# Patient Record
Sex: FEMALE | Race: WHITE
[De-identification: names, ages, dates, MRNs, and addresses within clinical notes are randomized per-mention and may not be internally consistent; named-entity substitution may affect disease eponyms.]

---

## 2017-06-10 ENCOUNTER — HEALTH MAINTENANCE LETTER (OUTPATIENT)
Age: 22
End: 2017-06-10

## 2017-08-01 ENCOUNTER — TRANSFERRED RECORDS (OUTPATIENT)
Dept: HEALTH INFORMATION MANAGEMENT | Facility: CLINIC | Age: 22
End: 2017-08-01

## 2017-08-01 LAB — PAP SMEAR - HIM PATIENT REPORTED: NEGATIVE

## 2017-08-26 ENCOUNTER — HEALTH MAINTENANCE LETTER (OUTPATIENT)
Age: 22
End: 2017-08-26

## 2019-07-01 ENCOUNTER — OFFICE VISIT (OUTPATIENT)
Dept: FAMILY MEDICINE | Facility: CLINIC | Age: 24
End: 2019-07-01
Payer: COMMERCIAL

## 2019-07-01 VITALS
RESPIRATION RATE: 16 BRPM | HEIGHT: 66 IN | BODY MASS INDEX: 24.59 KG/M2 | HEART RATE: 69 BPM | WEIGHT: 153 LBS | SYSTOLIC BLOOD PRESSURE: 120 MMHG | TEMPERATURE: 98.3 F | DIASTOLIC BLOOD PRESSURE: 68 MMHG

## 2019-07-01 DIAGNOSIS — R30.0 DYSURIA: Primary | ICD-10-CM

## 2019-07-01 DIAGNOSIS — E87.6 HYPOKALEMIA: ICD-10-CM

## 2019-07-01 DIAGNOSIS — Z11.8 SPECIAL SCREENING EXAMINATION FOR CHLAMYDIAL DISEASE: ICD-10-CM

## 2019-07-01 LAB
ALBUMIN UR-MCNC: NEGATIVE MG/DL
APPEARANCE UR: CLEAR
BILIRUB UR QL STRIP: NEGATIVE
COLOR UR AUTO: YELLOW
GLUCOSE UR STRIP-MCNC: NEGATIVE MG/DL
HGB UR QL STRIP: ABNORMAL
KETONES UR STRIP-MCNC: NEGATIVE MG/DL
LEUKOCYTE ESTERASE UR QL STRIP: ABNORMAL
NITRATE UR QL: NEGATIVE
PH UR STRIP: 5.5 PH (ref 5–7)
RBC #/AREA URNS AUTO: ABNORMAL /HPF
SOURCE: ABNORMAL
SP GR UR STRIP: 1.01 (ref 1–1.03)
UROBILINOGEN UR STRIP-ACNC: 0.2 EU/DL (ref 0.2–1)
WBC #/AREA URNS AUTO: ABNORMAL /HPF

## 2019-07-01 PROCEDURE — 87086 URINE CULTURE/COLONY COUNT: CPT | Performed by: PHYSICIAN ASSISTANT

## 2019-07-01 PROCEDURE — 81001 URINALYSIS AUTO W/SCOPE: CPT | Performed by: PHYSICIAN ASSISTANT

## 2019-07-01 PROCEDURE — 99203 OFFICE O/P NEW LOW 30 MIN: CPT | Performed by: PHYSICIAN ASSISTANT

## 2019-07-01 PROCEDURE — 36415 COLL VENOUS BLD VENIPUNCTURE: CPT | Performed by: PHYSICIAN ASSISTANT

## 2019-07-01 PROCEDURE — 87491 CHLMYD TRACH DNA AMP PROBE: CPT | Performed by: PHYSICIAN ASSISTANT

## 2019-07-01 PROCEDURE — 84132 ASSAY OF SERUM POTASSIUM: CPT | Performed by: PHYSICIAN ASSISTANT

## 2019-07-01 RX ORDER — LEVONORGESTREL AND ETHINYL ESTRADIOL 0.15-0.03
1 KIT ORAL DAILY
COMMUNITY
Start: 2018-11-29

## 2019-07-01 ASSESSMENT — ENCOUNTER SYMPTOMS
RESPIRATORY NEGATIVE: 1
CARDIOVASCULAR NEGATIVE: 1
GASTROINTESTINAL NEGATIVE: 1
PSYCHIATRIC NEGATIVE: 1
EYES NEGATIVE: 1
CONSTITUTIONAL NEGATIVE: 1
MUSCULOSKELETAL NEGATIVE: 1
NEUROLOGICAL NEGATIVE: 1

## 2019-07-01 ASSESSMENT — MIFFLIN-ST. JEOR: SCORE: 1465.75

## 2019-07-01 NOTE — PROGRESS NOTES
Subjective     Haley Ann Schoenecker is a 24 year old female who presents to clinic today for the following health issues:    HPI   URINARY TRACT SYMPTOMS      Duration: 2-3 days    Description  dysuria and frequency in the morning    Intensity:  moderate    Accompanying signs and symptoms:  Fever/chills: no   Flank pain no   Nausea and vomiting: no   Vaginal symptoms: none  Abdominal/Pelvic Pain: no     History  History of frequent UTI's: no   History of kidney stones: no   Sexually Active: YES  Possibility of pregnancy: No    Precipitating or alleviating factors: None    Therapies tried and outcome: increase fluid intake   Outcome: n/a    No history of UTI  Same sexual partner for 3 years  No vaginal discharge, itching, rash    Hypokalemia (potassium of 3.1 on routine BMP 6 months ago).  Patient is requesting this be rechecked today.     There is no problem list on file for this patient.    History reviewed. No pertinent surgical history.    Social History     Tobacco Use     Smoking status: Never Smoker     Smokeless tobacco: Never Used   Substance Use Topics     Alcohol use: Yes     Alcohol/week: 0.0 oz     Family History   Problem Relation Age of Onset     Hypertension Mother      Melanoma Mother          Current Outpatient Medications   Medication Sig Dispense Refill     levonorgestrel-ethinyl estradiol (JOLESSA) 0.15-0.03 MG tablet Take 1 tablet by mouth daily       clindamycin (CLEOCIN T) 1 % external solution Apply topically daily       drospirenone-ethinyl estradiol (OSKAR) 3-0.03 MG per tablet Take 1 tablet by mouth daily       tretinoin (RETIN-A) 0.1 % cream Apply topically At Bedtime       No Known Allergies    Reviewed and updated as needed this visit by Provider         Review of Systems   Constitutional: Negative.    HENT: Negative.    Eyes: Negative.    Respiratory: Negative.    Cardiovascular: Negative.    Gastrointestinal: Negative.    Musculoskeletal: Negative.    Skin: Negative.   "  Neurological: Negative.    Psychiatric/Behavioral: Negative.          Objective    /68 (Cuff Size: Adult Regular)   Pulse 69   Temp 98.3  F (36.8  C) (Tympanic)   Resp 16   Ht 1.676 m (5' 6\")   Wt 69.4 kg (153 lb)   BMI 24.69 kg/m    Physical Exam   Constitutional: She is oriented to person, place, and time. She appears well-developed and well-nourished. No distress.   HENT:   Head: Normocephalic and atraumatic.   Nose: Nose normal.   Eyes: Conjunctivae and EOM are normal.   Neck: Normal range of motion.   Pulmonary/Chest: Effort normal.   Abdominal: Soft. There is no tenderness. There is no rigidity, no guarding and no CVA tenderness.   Neurological: She is alert and oriented to person, place, and time.   Skin: Skin is warm and dry.   Psychiatric: She has a normal mood and affect. Her behavior is normal.       Diagnostic Test Results:  Results for orders placed or performed in visit on 07/01/19 (from the past 24 hour(s))   UA with Microscopic reflex to Culture   Result Value Ref Range    Color Urine Yellow     Appearance Urine Clear     Glucose Urine Negative NEG^Negative mg/dL    Bilirubin Urine Negative NEG^Negative    Ketones Urine Negative NEG^Negative mg/dL    Specific Gravity Urine 1.010 1.003 - 1.035    pH Urine 5.5 5.0 - 7.0 pH    Protein Albumin Urine Negative NEG^Negative mg/dL    Urobilinogen Urine 0.2 0.2 - 1.0 EU/dL    Nitrite Urine Negative NEG^Negative    Blood Urine Trace (A) NEG^Negative    Leukocyte Esterase Urine Trace (A) NEG^Negative    Source Midstream Urine     WBC Urine 0 - 5 OTO5^0 - 5 /HPF    RBC Urine O - 2 OTO2^O - 2 /HPF           Assessment & Plan   Problem List Items Addressed This Visit     None      Visit Diagnoses     Dysuria    -  Primary    Relevant Orders    UA with Microscopic reflex to Culture (Completed)    Urine Culture Aerobic Bacterial    Special screening examination for chlamydial disease        Relevant Orders    Chlamydia trachomatis PCR    Hypokalemia    "     Relevant Orders    Potassium         Dysuria - plan is to culture, IF POSITIVE, we will treat  If negative and symptoms resist - check UA first void of day (lab only appointment)    There are no Patient Instructions on file for this visit.    Return in about 1 week (around 7/8/2019) for a recheck if you are not improved.    Adalgisa Carlin PA-C  [facility]

## 2019-07-01 NOTE — Clinical Note
Please abstract the following data from this visit with this patient into the appropriate field in Epic:Pap smear done on this date: 8/2017 (approximately), by this group: Stephanie bruner, results were normal.

## 2019-07-02 LAB
BACTERIA SPEC CULT: NORMAL
C TRACH DNA SPEC QL NAA+PROBE: NEGATIVE
POTASSIUM SERPL-SCNC: 3.7 MMOL/L (ref 3.4–5.3)
SPECIMEN SOURCE: NORMAL
SPECIMEN SOURCE: NORMAL

## 2019-07-03 ENCOUNTER — TELEPHONE (OUTPATIENT)
Dept: FAMILY MEDICINE | Facility: CLINIC | Age: 24
End: 2019-07-03

## 2019-07-03 DIAGNOSIS — R82.90 NONSPECIFIC FINDING ON EXAMINATION OF URINE: Primary | ICD-10-CM

## 2019-07-03 DIAGNOSIS — R31.9 HEMATURIA, UNSPECIFIED TYPE: Primary | ICD-10-CM

## 2019-07-03 DIAGNOSIS — R31.9 HEMATURIA, UNSPECIFIED TYPE: ICD-10-CM

## 2019-07-03 LAB
ALBUMIN UR-MCNC: 30 MG/DL
APPEARANCE UR: CLEAR
BILIRUB UR QL STRIP: NEGATIVE
COLOR UR AUTO: YELLOW
GLUCOSE UR STRIP-MCNC: NEGATIVE MG/DL
HGB UR QL STRIP: ABNORMAL
KETONES UR STRIP-MCNC: NEGATIVE MG/DL
LEUKOCYTE ESTERASE UR QL STRIP: ABNORMAL
NITRATE UR QL: NEGATIVE
NON-SQ EPI CELLS #/AREA URNS LPF: ABNORMAL /LPF
PH UR STRIP: 8.5 PH (ref 5–7)
RBC #/AREA URNS AUTO: ABNORMAL /HPF
SOURCE: ABNORMAL
SP GR UR STRIP: 1.02 (ref 1–1.03)
UROBILINOGEN UR STRIP-ACNC: 0.2 EU/DL (ref 0.2–1)
WBC #/AREA URNS AUTO: ABNORMAL /HPF

## 2019-07-03 PROCEDURE — 81001 URINALYSIS AUTO W/SCOPE: CPT | Performed by: PHYSICIAN ASSISTANT

## 2019-07-03 PROCEDURE — 87086 URINE CULTURE/COLONY COUNT: CPT | Performed by: PHYSICIAN ASSISTANT

## 2019-07-03 RX ORDER — SULFAMETHOXAZOLE/TRIMETHOPRIM 800-160 MG
1 TABLET ORAL 2 TIMES DAILY
Qty: 10 TABLET | Refills: 0 | Status: SHIPPED | OUTPATIENT
Start: 2019-07-03 | End: 2019-07-08

## 2019-07-03 NOTE — TELEPHONE ENCOUNTER
Reason for call:  Patient reporting a symptom    Symptom or request:   Patient was seen Monday for UTI    Lab results were inconclusive  Natanael Carlin said she could follow up at the lab but there are no orders  Her symptoms are worsening and she has blood in her urine  Patient would like to come today for UA    Duration (how long have symptoms been present): Since at least Monday 07/01/2019    Have you been treated for this before? Yes    Additional comments:    She would like to come today for UA    Phone Number patient can be reached at:  Cell number on file:    Telephone Information:   Mobile [phone]       Best Time:  anytime    Can we leave a detailed message on this number:  YES    Call taken on 7/3/2019 at 3:06 PM by TEO BROWNLEE

## 2019-07-03 NOTE — RESULT ENCOUNTER NOTE
Manisha    Your lab tests are complete and I have reviewed the results.     - Your lab results look great; everything is normal.    If you have any questions or concerns, please feel free to call or send a [employer] message.    Sincerely,  Natanael Carlin PA-C

## 2019-07-03 NOTE — TELEPHONE ENCOUNTER
Patient should leave a UA at any [facility] TODAY.     I also sent an antibiotic for UTI to the pharmacy in her chart ([facility] in [facility]).  She should  the antibiotic and start it right away.     Natanael Carlin PA-C

## 2019-07-05 ENCOUNTER — TELEPHONE (OUTPATIENT)
Dept: FAMILY MEDICINE | Facility: CLINIC | Age: 24
End: 2019-07-05

## 2019-07-05 DIAGNOSIS — N30.01 ACUTE CYSTITIS WITH HEMATURIA: Primary | ICD-10-CM

## 2019-07-05 LAB
BACTERIA SPEC CULT: NO GROWTH
SPECIMEN SOURCE: NORMAL

## 2019-07-05 NOTE — TELEPHONE ENCOUNTER
Called and left message for patient - just calling to check in and see if the antibiotic is working well.      We are still waiting for the urine culture results.     Natanael Carlin PA-C

## 2019-07-05 NOTE — RESULT ENCOUNTER NOTE
Manisha    Your lab tests are complete and I have reviewed the results.     - The UA was consistent with UTI  - The urine culture did not isolate any specific bacteria.    Take the antibiotic as prescribed, follow up if symptoms do not resolve.     If you have any questions or concerns, please feel free to call or send a [employer] message.    Sincerely,  Natanael Carlin PA-C

## 2019-07-08 NOTE — TELEPHONE ENCOUNTER
Attempted to call patient, voice mailbox is full. UC showed no growth. Would DENG Hernandez need us to attempt to call patient again?

## 2019-09-30 ENCOUNTER — HEALTH MAINTENANCE LETTER (OUTPATIENT)
Age: 24
End: 2019-09-30

## 2021-01-15 ENCOUNTER — HEALTH MAINTENANCE LETTER (OUTPATIENT)
Age: 26
End: 2021-01-15

## 2021-10-24 ENCOUNTER — HEALTH MAINTENANCE LETTER (OUTPATIENT)
Age: 26
End: 2021-10-24

## 2022-10-16 ENCOUNTER — HEALTH MAINTENANCE LETTER (OUTPATIENT)
Age: 27
End: 2022-10-16

## 2022-12-03 ENCOUNTER — HEALTH MAINTENANCE LETTER (OUTPATIENT)
Age: 27
End: 2022-12-03

## 2022-12-06 NOTE — TELEPHONE ENCOUNTER
BREAK COVERAGE RN    Pt remains to be resting in cart. NAD.  Both side rails up. Bed locked in lowest position. Call light within reach.    Pt was informed Rx for abx was sent to [facility] pharmacy. Future lab only appointment was scheduled. Advised that she leave urine specimen before she takes abx. Pt verbalized agreement with plan.